# Patient Record
(demographics unavailable — no encounter records)

---

## 2024-11-14 NOTE — DATA REVIEWED
[FreeTextEntry1] : 	 Exam requested by: KATHY BEAVERS [address] [facility] [address] SITE PERFORMED: [place] SITE PHONE: [phone] Patient: KESHAV AGUIRRE YOB: 1965 Phone: [phone] MRN: [id number] Acc: [id number] Date of Exam: 10-   EXAM:  ULTRASOUND ABDOMEN COMPLETE AND PELVIS TRANSABDOMINAL  HISTORY:  Female, 58 years-old with abdominal pain, postmenopausal for 8 years  TECHNIQUE:    Abdomen:  Using real-time ultrasonography, the abdomen was imaged. Longitudinal and transverse images were obtained. Grayscale and color Doppler imaging is utilized. Static images are provided for review.   Pelvis:  Transabdominal real time gray scale multiplanar images were obtained with the high resolution broadband curved phased-array transducer supplemented with Color Doppler. Static images are provided for review.  COMPARISON:  None    FINDINGS ABDOMEN:    Exam is limited due to body habitus and bowel gas.    Abdominal Aorta/IVC: No evidence of abdominal aortic aneurysm. Visualized portions of the IVC were patent.  Liver: The liver measures 15.1 cm in length. Increased echogenicity is seen which may be due to fatty infiltration or hepatocellular disease. Clinical correlation is recommended. Nonspecific echogenic solid 3.1 x 2.2 x 2.8 cm right hepatic mass is seen. This may be a hemangioma, adenoma, HCC metastasis can also have this appearance in the proper clinical setting. Otherwise no focal hepatic lesions are identified.  Gallbladder: No gallstones, no wall thickening, and no sonographic Rosales's Sign.There is evidence for sludge within the gallbladder.  Bile Duct: Normal measuring 0.2 cm diameter at the jose l hepatis.  Pancreas: The visualized portion of the body of the pancreas is within normal limits. The tail is obscured by overlying bowel gas. No pancreatic duct dilatation.  Kidneys: Normal in size, morphology and cortical echotexture. There is no suspicious renal lesion. No hydronephrosis, shadowing calculi or perinephric fluid. Right Kidney: 12.2 cm in length.  Left Kidney:   12.6 cm in length.   Spleen: Normal size, contour and echotexture measuring 7.5 cm in length.   FINDINGS PELVIS:  UTERUS: The uterus is anteverted, measuring 8.3 x 3.5 x 4.5 cm (69 cc) with heterogeneity of the echotexture. A 3.2 x 3.4 x 3.3 cm transmural fibroid is seen.  ENDOMETRIUM: Normal thickness, echotexture and morphology; 0.35 cm maximum thickness.  RIGHT OVARY: This was not visualized despite a concerted effort by the technologist. The ovary may be obscured by bowel gas, reside in an ectopic position, or was surgically resected among other less likely possibilities. No cysts or suspicious findings are identified in the expected adnexal region.  LEFT OVARY: This was not visualized despite a concerted effort by the technologist. The ovary may be obscured by bowel gas, reside in an ectopic position, or was surgically resected among other less likely possibilities. No cysts or suspicious findings are identified in the expected adnexal region.  FREE FLUID: No significant free fluid is noted within the posterior pelvic cul-de-sac.  IMPRESSION:    ABDOMEN:  1. Increased echogenicity of the liver which may be due to fatty infiltration or hepatocellular disease. Clinical correlation is recommended. 2. Nonspecific echogenic solid 3.1 cm right hepatic mass. Six-month ultrasound surveillance and/or MRI abdomen with and without contrast using Eovist scan protocol is recommended for further evaluation 3. Sludge within the gallbladder  PELVIS: 1. Heterogeneous fibroid uterus. Largest fibroid is a 3.3 cm transmural fibroid 2. Nonvisualization of the bilateral ovaries     Thank you for the opportunity to participate in the care of this patient.     PETROS DELUNA MD  - Electronically Signed: 11- 12:45 PM  Physician to Physician Direct Line is: [phone]

## 2024-11-14 NOTE — HISTORY OF PRESENT ILLNESS
[de-identified] : Ms. KESHAV HAIRSTON is a 58 year  old patient who was referred by Dr. Bridgett Yang with the chief complaint of having right upper quadrant and epigastric pain on and off  for  a long time . Pain is non-radiating.    She reports no nausea or vomiting and no history of jaundice, acholia or acholuria.   Appetite is good and weight is stable.   She   has no family history of biliary tract disease.   she reports constipations  She had an abdominal sonogram on  10/28/2024 which revealed  sludge within the gallbladder. CBD is normal

## 2024-11-14 NOTE — PHYSICAL EXAM
[Abdominal Masses] : No abdominal masses [Abdomen Tenderness] : ~T ~M No abdominal tenderness [Alert] : alert [Oriented to Person] : oriented to person [Oriented to Time] : oriented to time [Calm] : calm [de-identified] : She  is alert, well-groomed, and in NAD   [de-identified] : anicteric.  Nasal mucosa pink, septum midline. Oral mucosa pink.  Tongue midline, Pharynx without exudates.   [de-identified] : Neck supple. Trachea midline. Thyroid isthmus barely palpable, lobes not felt.

## 2024-11-14 NOTE — PHYSICAL EXAM
[Abdominal Masses] : No abdominal masses [Abdomen Tenderness] : ~T ~M No abdominal tenderness [Alert] : alert [Oriented to Person] : oriented to person [Oriented to Time] : oriented to time [Calm] : calm [de-identified] : She  is alert, well-groomed, and in NAD   [de-identified] : anicteric.  Nasal mucosa pink, septum midline. Oral mucosa pink.  Tongue midline, Pharynx without exudates.   [de-identified] : Neck supple. Trachea midline. Thyroid isthmus barely palpable, lobes not felt.

## 2024-11-14 NOTE — DATA REVIEWED
[FreeTextEntry1] : 	 Exam requested by: KATHY BEAVERS [address] [facility] [address] SITE PERFORMED: [place] SITE PHONE: [phone] Patient: KESHAV AGUIRRE YOB: 1965 Phone: [phone] MRN: [id number] Acc: [id number] Date of Exam: 10-   EXAM:  ULTRASOUND ABDOMEN COMPLETE AND PELVIS TRANSABDOMINAL  HISTORY:  Female, 58 years-old with abdominal pain, postmenopausal for 8 years  TECHNIQUE:    Abdomen:  Using real-time ultrasonography, the abdomen was imaged. Longitudinal and transverse images were obtained. Grayscale and color Doppler imaging is utilized. Static images are provided for review.   Pelvis:  Transabdominal real time gray scale multiplanar images were obtained with the high resolution broadband curved phased-array transducer supplemented with Color Doppler. Static images are provided for review.  COMPARISON:  None    FINDINGS ABDOMEN:    Exam is limited due to body habitus and bowel gas.    Abdominal Aorta/IVC: No evidence of abdominal aortic aneurysm. Visualized portions of the IVC were patent.  Liver: The liver measures 15.1 cm in length. Increased echogenicity is seen which may be due to fatty infiltration or hepatocellular disease. Clinical correlation is recommended. Nonspecific echogenic solid 3.1 x 2.2 x 2.8 cm right hepatic mass is seen. This may be a hemangioma, adenoma, HCC metastasis can also have this appearance in the proper clinical setting. Otherwise no focal hepatic lesions are identified.  Gallbladder: No gallstones, no wall thickening, and no sonographic Rosales's Sign.There is evidence for sludge within the gallbladder.  Bile Duct: Normal measuring 0.2 cm diameter at the jose l hepatis.  Pancreas: The visualized portion of the body of the pancreas is within normal limits. The tail is obscured by overlying bowel gas. No pancreatic duct dilatation.  Kidneys: Normal in size, morphology and cortical echotexture. There is no suspicious renal lesion. No hydronephrosis, shadowing calculi or perinephric fluid. Right Kidney: 12.2 cm in length.  Left Kidney:   12.6 cm in length.   Spleen: Normal size, contour and echotexture measuring 7.5 cm in length.   FINDINGS PELVIS:  UTERUS: The uterus is anteverted, measuring 8.3 x 3.5 x 4.5 cm (69 cc) with heterogeneity of the echotexture. A 3.2 x 3.4 x 3.3 cm transmural fibroid is seen.  ENDOMETRIUM: Normal thickness, echotexture and morphology; 0.35 cm maximum thickness.  RIGHT OVARY: This was not visualized despite a concerted effort by the technologist. The ovary may be obscured by bowel gas, reside in an ectopic position, or was surgically resected among other less likely possibilities. No cysts or suspicious findings are identified in the expected adnexal region.  LEFT OVARY: This was not visualized despite a concerted effort by the technologist. The ovary may be obscured by bowel gas, reside in an ectopic position, or was surgically resected among other less likely possibilities. No cysts or suspicious findings are identified in the expected adnexal region.  FREE FLUID: No significant free fluid is noted within the posterior pelvic cul-de-sac.  IMPRESSION:    ABDOMEN:  1. Increased echogenicity of the liver which may be due to fatty infiltration or hepatocellular disease. Clinical correlation is recommended. 2. Nonspecific echogenic solid 3.1 cm right hepatic mass. Six-month ultrasound surveillance and/or MRI abdomen with and without contrast using Eovist scan protocol is recommended for further evaluation 3. Sludge within the gallbladder  PELVIS: 1. Heterogeneous fibroid uterus. Largest fibroid is a 3.3 cm transmural fibroid 2. Nonvisualization of the bilateral ovaries     Thank you for the opportunity to participate in the care of this patient.     PETROS DELUNA MD  - Electronically Signed: 11- 12:45 PM  Physician to Physician Direct Line is: [phone]

## 2024-11-14 NOTE — HISTORY OF PRESENT ILLNESS
[de-identified] : Ms. KESHAV HAIRSTON is a 58 year  old patient who was referred by Dr. Bridgett Yang with the chief complaint of having right upper quadrant and epigastric pain on and off  for  a long time . Pain is non-radiating.    She reports no nausea or vomiting and no history of jaundice, acholia or acholuria.   Appetite is good and weight is stable.   She   has no family history of biliary tract disease.   she reports constipations  She had an abdominal sonogram on  10/28/2024 which revealed  sludge within the gallbladder. CBD is normal

## 2024-11-14 NOTE — PLAN
[FreeTextEntry1] : Ms. KRUNAL HAIRSTON  was told significance of findings, options, risks and benefits were explained.  Informed consent for laparoscopic/possible open  cholecystectomy  and potential risks, benefits and alternatives (surgical options were discussed including non-surgical options or the option of no surgery) to the planned surgery were discussed in depth.  All surgical options were discussed including non-surgical treatments.  She wishes to proceed with surgery.  We will plan for surgery on at the next available date, pending any required insurance pre-certification or pre-approval. She agrees to obtain any necessary pre-operative evaluations and testing prior to surgery. Patient instructed to maintain a fat-free diet, and to seek immediate medical attention with any acute change or worsening of symptoms, including but not limited to abdominal pain, fever, chills, nausea, vomiting, or yellowing of the skin.  Patient advised to seek immediate medical attention with any acute change in symptoms or with the development of any new or worsening symptoms.  Patient's questions and concerns addressed to patient's satisfaction, and patient verbalized an understanding of the information discussed.

## 2024-11-14 NOTE — CONSULT LETTER
[Dear  ___] : Dear  [unfilled], [Consult Letter:] : I had the pleasure of evaluating your patient, [unfilled]. [Please see my note below.] : Please see my note below. [Consult Closing:] : Thank you very much for allowing me to participate in the care of this patient.  If you have any questions, please do not hesitate to contact me. [Sincerely,] : Sincerely, [FreeTextEntry3] : Rudi Khoury MD, FACS